# Patient Record
Sex: MALE | Race: BLACK OR AFRICAN AMERICAN | NOT HISPANIC OR LATINO | Employment: FULL TIME | URBAN - METROPOLITAN AREA
[De-identification: names, ages, dates, MRNs, and addresses within clinical notes are randomized per-mention and may not be internally consistent; named-entity substitution may affect disease eponyms.]

---

## 2023-06-21 ENCOUNTER — HOSPITAL ENCOUNTER (EMERGENCY)
Facility: CLINIC | Age: 33
Discharge: HOME | End: 2023-06-22
Attending: PSYCHIATRY & NEUROLOGY | Admitting: PSYCHIATRY & NEUROLOGY
Payer: COMMERCIAL

## 2023-06-21 DIAGNOSIS — F11.20 UNCOMPLICATED OPIOID DEPENDENCE (H): ICD-10-CM

## 2023-06-21 DIAGNOSIS — F15.10 METHAMPHETAMINE ABUSE (H): ICD-10-CM

## 2023-06-21 LAB — SARS-COV-2 RNA RESP QL NAA+PROBE: NEGATIVE

## 2023-06-21 PROCEDURE — 99285 EMERGENCY DEPT VISIT HI MDM: CPT | Mod: 25 | Performed by: PSYCHIATRY & NEUROLOGY

## 2023-06-21 PROCEDURE — 87635 SARS-COV-2 COVID-19 AMP PRB: CPT | Performed by: PSYCHIATRY & NEUROLOGY

## 2023-06-21 PROCEDURE — 250N000013 HC RX MED GY IP 250 OP 250 PS 637: Performed by: PSYCHIATRY & NEUROLOGY

## 2023-06-21 PROCEDURE — 99285 EMERGENCY DEPT VISIT HI MDM: CPT | Performed by: PSYCHIATRY & NEUROLOGY

## 2023-06-21 PROCEDURE — 90791 PSYCH DIAGNOSTIC EVALUATION: CPT

## 2023-06-21 RX ORDER — OLANZAPINE 10 MG/1
10 TABLET, ORALLY DISINTEGRATING ORAL ONCE
Status: COMPLETED | OUTPATIENT
Start: 2023-06-21 | End: 2023-06-21

## 2023-06-21 RX ADMIN — OLANZAPINE 10 MG: 10 TABLET, ORALLY DISINTEGRATING ORAL at 23:48

## 2023-06-21 ASSESSMENT — ACTIVITIES OF DAILY LIVING (ADL): ADLS_ACUITY_SCORE: 35

## 2023-06-22 VITALS
SYSTOLIC BLOOD PRESSURE: 101 MMHG | DIASTOLIC BLOOD PRESSURE: 66 MMHG | TEMPERATURE: 98.6 F | RESPIRATION RATE: 18 BRPM | OXYGEN SATURATION: 100 % | HEART RATE: 87 BPM

## 2023-06-22 ASSESSMENT — COLUMBIA-SUICIDE SEVERITY RATING SCALE - C-SSRS
5. HAVE YOU STARTED TO WORK OUT OR WORKED OUT THE DETAILS OF HOW TO KILL YOURSELF? DO YOU INTEND TO CARRY OUT THIS PLAN?: YES
2. HAVE YOU ACTUALLY HAD ANY THOUGHTS OF KILLING YOURSELF?: YES
4. HAVE YOU HAD THESE THOUGHTS AND HAD SOME INTENTION OF ACTING ON THEM?: YES
ATTEMPT LIFETIME: YES
2. HAVE YOU ACTUALLY HAD ANY THOUGHTS OF KILLING YOURSELF?: NO
1. HAVE YOU WISHED YOU WERE DEAD OR WISHED YOU COULD GO TO SLEEP AND NOT WAKE UP?: YES
4. HAVE YOU HAD THESE THOUGHTS AND HAD SOME INTENTION OF ACTING ON THEM?: NO
REASONS FOR IDEATION LIFETIME: EQUALLY TO GET ATTENTION, REVENGE, OR A REACTION FROM OTHERS AND TO END/STOP THE PAIN
3. HAVE YOU BEEN THINKING ABOUT HOW YOU MIGHT KILL YOURSELF?: YES
REASONS FOR IDEATION PAST MONTH: EQUALLY TO GET ATTENTION, REVENGE, OR A REACTION FROM OTHERS AND TO END/STOP THE PAIN
ATTEMPT PAST THREE MONTHS: NO
1. IN THE PAST MONTH, HAVE YOU WISHED YOU WERE DEAD OR WISHED YOU COULD GO TO SLEEP AND NOT WAKE UP?: YES
5. HAVE YOU STARTED TO WORK OUT OR WORKED OUT THE DETAILS OF HOW TO KILL YOURSELF? DO YOU INTEND TO CARRY OUT THIS PLAN?: NO

## 2023-06-22 ASSESSMENT — ACTIVITIES OF DAILY LIVING (ADL)
ADLS_ACUITY_SCORE: 35

## 2023-06-22 NOTE — ED PROVIDER NOTES
ED Observation Discharge Summary  [facility]  Discharge Date: 6/22/2023    Priscilla Pritchett MRN: [id number]   Age: 33 year old YOB: 1990     Brief HPI & Initial ED Course     Chief Complaint   Patient presents with     Depression     Struggling due to GF breaking up, housing problem, expecting a baby, pt reports feeling overwhelmed but denies suicidal ideation     HPI  Priscilla Pritchett is a 33 year old male with PMH notable for bipolar disorder who presented to the ED with altered mental status. Initial history was limited due to altered mental status. The patient arrived with altered mental status with reason to suspect alcohol or other drug intoxication as etiology, and an exam that was without findings suggestive of trauma.     Upon being evaluated in the emergency department, the patient was found to have a condition that would benefit from ongoing monitoring. Observation care was initiated with plan for close clinical monitoring of the patient and his mental status for clearing of intoxicating substance, and broadening of the work-up if not clearing appropriately or if other indications develop.     See ED Observation H&P for further details on the patient's presenting history and initial evaluation.     Physical Exam   BP: 102/69  Pulse: 61  Temp: 98.6  F (37  C)  Resp: 18  SpO2: 100 %    Physical Exam  General: no acute distress. Alert.   HENT: MMM. Atraumatic head.   Eyes: PERRL, normal sclerae   Neck: non-tender, supple  Cardio: Regular rate. Regular rhythm.   Resp: Normal work of breathing, normal respiratory rate  Abdomen: non-distended  Neuro: alert, fully oriented. Steady gait. CN II-XII grossly intact. Grossly normal strength and sensation.   Integumentary/Skin: no rash visualized, normal color    Results      Procedures                  Labs Ordered and Resulted from Time of ED Arrival to Time of ED Departure   COVID-19 VIRUS (CORONAVIRUS) BY PCR - Normal        Result Value    SARS CoV2 PCR Negative       Patient underwent CD assessment while in ED.       Observation Course   The patient was admitted to observation status with plan for close clinical monitoring of the patient and his mental status for clearing of intoxicating substance, and broadening of the work-up if not clearing appropriately or if other indications develop.     Serial assessments of the patient's mental status were performed. Nursing notes were reviewed. During the observation period, the patient did not require medications for agitation, and did not require restraints/seclusion for patient and/or provider safety.         With monitoring, patient's mental status cleared. Patient then clinically sober with steady gait and tolerating PO. Normalization of mental status with sobering makes other causes of altered mental status very unlikely.     After counseling on the diagnosis, work-up, and treatment plan, the patient was discharged. Recommended safe cessation of EtOH/drugs and provided information on community treatment resources. Patient to follow-up with primary care and CD treatment in the coming days for recheck and further cessation counseling. Patient to return to the ED if any urgent or potentially life-threatening concerns.    Discharge Diagnoses:   Final diagnoses:   Methamphetamine abuse (H)   Uncomplicated opioid dependence (H)     Chart documentation was completed with Dragon voice-recognition software. Even though reviewed, this chart may still contain some grammatical, spelling, and word errors.     --  VÍCTOR FONG MD, MD  [facility] EMERGENCY DEPARTMENT  6/22/2023             Víctor Fong MD  06/22/23 4713

## 2023-06-22 NOTE — DISCHARGE INSTRUCTIONS
Scheduled Appointment  Type: Telepsychiatry-Virtual  Date: Monday, 6/26/2023    Time: 3:00 pm - 4:00 pm  Provider: Abby MACK PA-C  Location: [facility], [address] [place] [address], [address], [place] [address]  Phone: [phone]  Patient Instructions  Please fill New Patient Form by using following link. All forms need to be completed 24 hours prior to the appointment date/time by going to [email or web address] Please call us on [phone] 24 hours prior to your scheduled appointment to confirm that you are able to attend. We will provide you information about how to log into video call software when you call.    Type: Teletherapy  Date: Monday, 6/26/2023    Time: 6:00 pm - 7:00 pm  Provider: Albertina Robert  [facility]  Location: [facility], [address] [place] [address]  Phone: [phone]  Patient Instructions  Telehealth/Virtual services only. No in-person services. New patients are contacted via phone/email by CDS office managers (to confirm information), before 1st appointment. Electronic device w/video capabilities required for services. New patients have e-paperwork to complete prior to 1st appointment (access to e-docs given by CDS  during intake). Call CDS w/questions [phone] or email [email or web address]        ---------------------------------------------------------------------------------------------------------    You may self refer for most Crisis Residence services. This is a short-term service, typically 3-10 days, for stabilization when experiencing a mental health crisis. They provide housing and treatment services that integrate mental health, medical, and substance use care.    [facility] [employer]  Main phone: [phone]   Direct: [phone]   [address] [place], [place] [address] [address]     [facility]    [phone]   [address] [facility] [address]     [facility] [employer]  Main phone: [phone]   Direct: [phone]   [address] [place] [address]     [facility]  [phone]   [address] [place] [address]     [facility] **requires referral from a medical facility  [phone] [address] [place] [address]     Wherever you attend for a crisis residence, if possible bring any medications you may have in their prescribed bottles.       Substance Use Disorder Direct Access Resources    It is recommended that you abstain from all mood altering chemicals. Please contact the sober support hotline ([phone]) as needed; phones are answered 24 hours a day, 7 days a week.    To access substance use treatment you must have a comprehensive assessment completed to begin any treatment program.     If uninsured, please contact your [facility] of residence for eligibility screen to substance use disorder evaluation and treatment:    [place] - [phone]   [facility] [phone]   [facility] [phone]   [facility] [phone]   [facility] [phone]   [facility] [phone]   [facility] [phone]   [place] - [phone]     If you have private insurance, call the customer service number on the back of your insurance card to find an in-network substance abuse use disorder assessment. The ideal provider will be a treatment facility, licensed in the state of [place].     Community FRANCA Evaluations: Clients may call their county for a full list of providers - Availability and services listed belo are subject to change, please call the provider to confirm    [facility]  [phone] [address] [place], [place], [address]  *Please call the above number to schedule a comprehensive assessment for determination of level of care needs. In person and virtual appointments available Mon-Fri.    [facility], [address], [address] Floor, [address], [place] [address] (next to the outpatient lab)    Phone: [phone]   Provides bridging services to people with Opiate Use Disorders (OUD) seeking care. This is a front door to Medication Assisted Treatments (MAT), ages 16+  Walk In hours: Monday-Friday 9:00am-3:00pm    [facility]  [phone]  Walk in Assessments: Mon-Friday 7a-1:45p  [address], [place], [address]    [facility]  Central Access [phone]  [address] [place], [place], [address]  *by appointment only    [facility]  [phone] (phone consultation available )  Locations in: [place], [place], [facility], and [place], [place]  Irish virtual IOP [phone] or visit [email or web address]   Also offers LGBTQ programming     [facility]  [phone]  [address] [facility]  [place], [place], [address]  *Currently only offered via telehealth - call to set up an appointment    [facility]  [address] [place] [address], [address]  [facility]  For more information to to make a referral call:  [phone]  Walk-in on   9-11 a.m.    [facility]  [phone]  [address] [place], [place], [address]  *available by appointments only    [facility]  [phone]  [address] [place], [place], [address]  *available by appointment only    [facility]  [phone]  [address] [place], [place], [address]  *walk in assessments available M-F starting at 7 am.    [facility]  [phone]  Locations: [facility], [facility], and [place]  *Walk in assessments availble M-F starting at 8 am -virtual only    [facility]  [phone]  [address] [place] [address]    [facility]  Virtual + Locations: [facility], [place],  [place], [facility], [address] [facility]   [phone]  *available by appointment only    [facility]  [phone]  [address]  [place], [place], [address]    [facility]  [phone]  [address]  [place], [place], [address]  *available by appointment    [facility]  [phone]  [address]  [place], [place], [address]  *walk ins available M-TH from 9-3    [facility]  MAT program: [phone]  [address] [place], [place], [address]    [place]  [phone]  Same day substance use disorder assessments are available Monday - Friday, via walk-in or by appointment at the [place] location.  [address], [address], [place] [address]     [facility] - adolescent and adult SUDs services  [phone]  Offer services Monday through Friday, as well as evening hours Monday through Thursday. Normally, a first appointment will be scheduled within one week  [email or web address]  Locations all over [place]    If you are intoxicated, you may be required to detox at a detox facility before starting treatment. The following are detox facilities that you can self present to. All detox facilities are able to help you complete an assessment prior to discharge if you choose:    [place] [facility]: Arrive at a [place] [facility] for immediate medical evaluation    [facility]: [address] [place], [place], [address].         [phone]    [facility]: [address] [place], [place], [address]  [phone]     [facility]): [address] [place], [place], [address] [phone]     [facility]: [address] [place], [address], [phone]         Ways to help cope with sobriety:    -- Take prescribed medicines as scheduled  -- Keep follow-up appointments  -- Talk to others about your concerns  -- Get regular  exercise  -- Practice deep breathing skills  -- Eat a healthy diet  -- Use community resources, including hotline numbers, [facility] crisis and support meetings  -- Stay sober and avoid places/people/things associated with substance use  --Maintain a daily schedule/routine  --Get at least 7-8 hours of sleep per night  --Create a list 10--20 healthy activities that you can do that are enjoyable and do not involve substance use  --Create daily goals (approx. 1-4 goals) per day and work to achieve them throughout the day.       Free Resources:    [facility] ([facility])  [facility] connects people seeking recovery to resources that help foster and sustain long-term recovery. Whether you are seeking resources for treatment, transportation, housing, job training, education, health care or other pathways to recovery, [facility] is a great place to start.  Phone: 190.768.5542. www.minnesotaAlertMe.OpenTrust (Great listing of all types of recovery and non-recovery related resources)    Alcoholics Anonymous  Phone: 9-605-ALCOHOL  Website: HTTP://WWW.AA.ORG/  AA [place] (007-005-8035 or http://aaSustaination.org)  AA [place] (735-634-1786 or www.aastpaul.org)     Narcotics Anonymous  Phone: 864.918.3108  Website: www.Swarm Mobile.Top10.com.    [facility] [address], [place], [place],  Phone: [phone]  Drop-in Hours: Monday-Friday 9-11:30 am. By appointment at other times.  Provides: [facility] is a drop-in center on the east side [facility] that provides a safe space for individuals who are homeless and have a history of chemical use. Sobriety is not a requirement but drugs and alcohol are not allowed on the property.  Services: Non-clients can access drop-in services such as Recovery and Harm Reduction Groups, referrals to case management, community activities, shower facilities, and a pool table. Individuals who are homeless and have chemical health needs may be eligible for enrollment into  [facility]'s case management program. Clients and  work together to access benefits, treatment, health care, shelter, and external housing resources.        Aftercare Plan    If I am feeling unsafe or I am in a crisis, I will:   Contact my established care providers   Call the National Suicide Prevention Lifeline: 988  Go to the nearest emergency room   Call 911     Warning signs that I or other people might notice when a crisis is developing for me: any suicidal ideation with plan or intent    Things I am able to do on my own to cope or help me feel better: Grounding Techniques:  Try to notice where you are, your surroundings including the people, the sounds like the TV or radio.  Concentrate on your breathing. Take a deep cleansing breath from your diaphragm. Count the breaths as you exhale. Make sure you breath slowly.  Hold something that you find comforting, for some it may be a stuffed animal or a blanket. Notice how it feels in your hands. Is it hard or soft?  During a non-crisis time make a list of positive affirmations. Print them out and keep them handy for times of intense anxiety. At those times, read them aloud.  Try the eBooks in Motion game:  Name 5 things you can see in the room with you  Name 4 things you can feel ( chair on my back  or  feet on floor )   Name 3 things you can hear right now ( people talking  or  tv )   Name 2 things you can smell right now (or, 2 things you like the smell of)   Name 1 good thing about yourself  Create A Safe Place  Image a safe place -- it can be a real or imaginary place:   What do you see -- especially colors?   What sounds do you hear?   What sensations do you feel?   What smells do you smell?   What people or animals would you want in your safe place?   Imagine a protective bubble, wall or boundary around your safe place.   Imagine a door or gate with a guard at your safe place.   Image a lock and key to your safe place and only you can unlock it.  You  can draw or make a collage that represents your safe place.   Choose a souvenir of your safe place -- a color, an object, a song.   Keep your image of your safe place so you can come back to it when you need to.    Things that I am able to do with others to cope or help me better: let your family know if you are needing more support     Things I can use or do for distraction: Reduce Extreme Emotion  QUICKLY:  Changing Your Body Chemistry      T:  Change your body Temperature to change your autonomic nervous system   Use Ice Water to calm yourself down FAST   Put your face in a bowl of ice water (this is the best way; have the person keep his/her face in ice water for 30-45 seconds - initial research is showing that the longer s/he can hold her/his face in the water, the better the response), or   Splash ice water on your face, or hold an ice pack on your face      I:  Intensely exercise to calm down a body revved up by emotion   Examples: running, walking fast, jumping, playing basketball, weight lifting, swimming, calisthenics, etc.   Engage in exercises that DO NOT include violent behaviors. Exercises that utilize violent behaviors tend to function as  behavioral rehearsal,  and rather than calming the person down, may actually  rev  the person up more, increasing the likelihood of violence, and lessening the likelihood that they will  burn off  energy     P:  Progressively relax your muscles   Starting with your hands, moving to your forearms, upper arms, shoulders, neck, forehead, eyes, cheeks and lips, tongue and teeth, chest, upper back, stomach, buttocks, thighs, calves, ankles, feet   Tense (10 seconds,   of the way), then relax each muscle (all the way)   Notice the tension   Notice the difference when relaxed (by tensing first, and then relaxing, you are able to get a more thorough relaxation than by simply relaxing)     P: Paced breathing to relax   The standard technique is to begin with counting the  "number of steps one takes for a typical inhale, then counting the steps one takes for a typical exhale, and then lengthening the amount of steps for the exhalation by one or two steps.  OR  Repeat this pattern for 1-2 minutes  Inhale for four (4) seconds   Exhale for six (6) to eight (8) seconds   Research demonstrated that one can change one's overall level of anxiety by doing this exercise for even a few minutes per day     Changes I can make to support my mental health and wellness: begin substance use treatment and medication management    People in my life that I can ask for help: medication management provider and substance use assessment    Your [facility] has a mental health crisis team you can call 24/7: [facility]  [phone]     Other things that are important when I'm in crisis: continue taking medication as prescribed     Additional resources and information:     Crisis Lines  Crisis Text Line  Text 029746  You will be connected with a trained live crisis counselor to provide support.    Por espanol, texto  MOISES a 414128 o texto a 442-AYUDAME en WhatsApp    The [employer]ee [employer] (LGBTQ Youth Crisis Line)  0.130.050.5871  text START to 118-576      Community Resources  Fast Tracker  Linking people to mental health and substance use disorder resources  fasttrackeDabban.org     [place] Mental Health Warm Line  Peer to peer support  Monday thru Saturday, 12 pm to 10 pm  627.255.0548 or 0.876.733.1572  Text \"Support\" to 49628    National Chloride on Mental Illness (DARIN)  871.719.5476 or 1.888.DARIN.HELPS      Mental Health Apps  My3  https://my3app.org/    VirtualHopeBox  https://oNoise.org/apps/virtual-hope-box/      Additional Information  Today you were seen by a licensed mental health professional through Triage and Transition services, [facility] ([facility])  for a crisis assessment in the Emergency Department at [facility].  It is recommended that " you follow up with your established providers (psychiatrist, mental health therapist, and/or primary care doctor - as relevant) as soon as possible. Coordinators from [facility] will be calling you in the next 24-48 hours to ensure that you have the resources you need.  You can also contact [facility] coordinators directly at [phone]. You may have been scheduled for or offered an appointment with a mental health provider. [facility] maintains an extensive network of licensed behavioral health providers to connect patients with the services they need.  We do not charge providers a fee to participate in our referral network.  We match patients with providers based on a patient's specific needs, insurance coverage, and location.  Our first effort will be to refer you to a provider within your care system, and will utilize providers outside your care system as needed.

## 2023-06-22 NOTE — ED PROVIDER NOTES
"    [facility] [place] EMERGENCY DEPARTMENT ([facility])    6/21/23      ED OBS H and P PROVIDER NOTE  AB  History     Chief Complaint   Patient presents with     Depression     Struggling due to GF breaking up, housing problem, expecting a baby, pt reports feeling overwhelmed but denies suicidal ideation     The history is provided by the patient and medical records.     Priscilla Pritchett is a 33 year old male with history of anxiety, depression, bipolar disorder, PTSD, substance use disorder, and borderline personality disorder who presents with worsening depression in setting of psychosocial stressors.  Patient was seen by DEC , please see consult note for further details.  He is 2 months behind on his rent. His rent is $1k a month and he is 2 months behind on his rent. Per DEC  his apartment complex has filed for eviction.  His girlfriend is 5 months pregnant and left him. He just started working a job 3 weeks ago as a , has his GED. He feels overwhelmed with this. As a result of all this stress he relapsed 2 days ago on meth and heroin again, by smoking and IV. Has been using constantly for past 2 days. He also vapes nicotine. he last used meth this morning, last used heroin last night.  Denies other substances. He states he does feel passively suicidal, rates it at a 6/10. He has no plan or intent. He reports 1 prior suicide attempt in 2014 via overdose. No suicide attempts since.  He states \"I need a 72 hour hold\" because he can't focus in his apartment on staying sober. He has no outpatient providers other than primary care. He denies homicidal ideation, no psychosis and no self-injury.  DEC  discussed assistance with reestablishing therapy and medications, patient is very happy with this plan.      [facility] Everywhere records reviewed, he was hospitalized at [facility] from 2/13-2/15/2023 for suicidal ideation.  During that admission he reported past inpatient admissions " for suicide attempts in  and .      Past Medical History  History reviewed. No pertinent past medical history.  Past Surgical History:   Procedure Laterality Date     MANDIBLE FRACTURE SURGERY       No current outpatient medications on file.    No Known Allergies  Family History  Family History   Problem Relation Age of Onset     Bipolar Disorder Maternal Uncle      Depression Other      Personality Disorder Other      Substance Abuse Father      Social History   Social History     Tobacco Use     Smoking status: Former     Packs/day: 0.50     Types: Vaping Device, Cigarettes     Quit date: 12/3/2014     Years since quittin.5   Substance Use Topics     Alcohol use: No     Comment: Alcoholic Drinks/day: History of substance of choice     Drug use: Not Currently     Types: Amphetamines, Marijuana         A medically appropriate review of systems was performed with pertinent positives and negatives noted in the HPI, and all other systems negative.    Physical Exam   BP: 102/69  Pulse: 61  Temp: 98.6  F (37  C)  Resp: 18  SpO2: 100 %  Physical Exam  Vitals and nursing note reviewed.   HENT:      Head: Normocephalic.   Eyes:      Pupils: Pupils are equal, round, and reactive to light.   Pulmonary:      Effort: Pulmonary effort is normal.   Musculoskeletal:         General: Normal range of motion.      Cervical back: Normal range of motion.   Neurological:      Mental Status: He is alert.   Psychiatric:         Attention and Perception: Attention normal. He does not perceive auditory or visual hallucinations.         Mood and Affect: Affect normal. Mood is anxious.         Speech: Speech normal.         Behavior: Behavior normal. Behavior is not agitated, aggressive, hyperactive or combative. Behavior is cooperative.         Thought Content: Thought content normal. Thought content is not paranoid or delusional. Thought content does not include homicidal or suicidal ideation.         Cognition and Memory:  Cognition and memory normal.         Judgment: Judgment normal.           ED Course, Procedures, & Data      Procedures            Results for orders placed or performed during the hospital encounter of 06/21/23   Asymptomatic COVID-19 Virus (Coronavirus) by PCR Nasopharyngeal     Status: Normal    Specimen: Nasopharyngeal; Swab   Result Value Ref Range    SARS CoV2 PCR Negative Negative    Narrative    Testing was performed using the Xpert Xpress SARS-CoV-2 Assay on the Cepheid Gene-Xpert Instrument Systems. Additional information about this Emergency Use Authorization (EUA) assay can be found via the Lab Guide. This test should be ordered for the detection of SARS-CoV-2 in individuals who meet SARS-CoV-2 clinical and/or epidemiological criteria as well as from individuals without symptoms or other reasons to suspect COVID-19. Test performance for asymptomatic patients has only been established in anterior nasal swab specimens. This test is for in vitro diagnostic use under the FDA EUA for laboratories certified under CLIA to perform high complexity testing. This test has not been FDA cleared or approved. A negative result does not rule out the presence of PCR inhibitors in the specimen or target RNA concentration below the limit of detection for the assay. The possibility of a false negative should be considered if the patient's recent exposure or clinical presentation suggests COVID-19. This test was validated by the [facility]. This laboratory is certified under the Clinical Laboratory Improvement Amendments (CLIA) as qualified to perform high complexity laboratory testing.       Medications   OLANZapine zydis (zyPREXA) ODT tab 10 mg (has no administration in time range)     Labs Ordered and Resulted from Time of ED Arrival to Time of ED Departure   COVID-19 VIRUS (CORONAVIRUS) BY PCR - Normal       Result Value    SARS CoV2 PCR Negative       No orders to display           Critical care was not performed.     Medical Decision Making  The patient's presentation was of high complexity (a chronic illness severe exacerbation, progression, or side effect of treatment).    The patient's evaluation involved:  an assessment requiring an independent historian (see separate area of note for details)  review of external note(s) from 3+ sources (see separate area of note for details)    The patient's management necessitated high risk (a decision regarding hospitalization).      Assessment & Plan    Patient is here for a mental health assessment. He admits to bingeing on meth and heroin and has been awake past several days. 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 He requests help for his ongoing polysubstance abuse problem. Patient is not suicidal. He is not impaired nor psychotic nor delusional.    Patient felt he should be placed on a 72 hour hold. He was told that it is not how the hold works. I recommended sleeping in the ED overnight with help of a dose of Zyprexa and see how he feels in the morning, likely refreshed once he slept. He may then be able to follow-up with an outpatient CD assessment for further treatment recommendations.    Patient agrees to the recommendations. He is made ED OBS. He is given a dose of Zyprexa 10 mg to help with sleeping.    I have reviewed the nursing notes. I have reviewed the findings, diagnosis, plan and need for follow up with the patient.    New Prescriptions    No medications on file       Final diagnoses:   Methamphetamine abuse (H)   Uncomplicated opioid dependence (H)       Jyoti MALIN, am serving as a trained medical scribe to document services personally performed by Dio Betancourt MD based on the provider's statements to me on June 21, 2023.  This document has been checked and approved by the attending provider.    IDio MD, was physically present and have  reviewed and verified the accuracy of this note documented by Jyoti Mello, medical scribe.      Dio eBtancourt MD     [facility] EMERGENCY DEPARTMENT  6/21/2023     Dio Betancourt MD  06/21/23 [id number]
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
## 2023-06-22 NOTE — PLAN OF CARE
"Priscilla Pritchett  June 22, 2023  Plan of Care Hand-off Note     Patient Care Path: Discharge    Plan for Care:     Patient is alert and oriented x4. Patient is adequately dressed and groomed. Patient has articulate speech with slow rate and normal volume. Patient has depressed mood and congruent affect. Patient was lethargic but nonetheless cooperative with the assessment process. 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 Patient endorses passive suicidal ideation \"3/5\" with no plan or intent. Patient denies any NSSI, HI, or AH/VH. Patient was given Zyprexa and allowed to sleep in our emergency department. Patient was scheduled for telepsychiatry, given resources for substance abuse treatment and crisis residences. Patient will be allowed to clear overnight and discharged in the morning.    Critical Safety Issues: polysubstance abuse and passive SI    Overview:  This patient is a child/adolescent: No    This patient has additional special visitor precautions: No    Legal Status: Voluntary    Contacts:    attempted to reach patient's mother listed in emergency contacts, FAMILIARachel Bjorn at [phone], but she did not answer.    Psychiatry Consult:  Psychiatry Consult not requested because medication management scheduled following anticipated discharge in the morning.    Updated Attending Provider regarding plan of care.    SAURABH Gimenez      "

## 2023-06-22 NOTE — PROGRESS NOTES
"Triage & Transition Services, Extended Care     Therapy Progress Note    Patient: Priscilla goes by \"Priscilla,\" uses he/him pronouns  Date of Service: June 22, 2023  Site of Service:  ED   Patient was seen in-person.     Presenting problem:   Priscilla is followed related to Boarding Status. Please see initial DEC/[facility] Crisis Assessment completed by Freida Matthews on 2/21 for complete assessment information. Notable concerns include depression and drug relapse.     Individuals Present: Priscilla & Agatha White    Session start: 956a  Session end: 1013a  Session duration in minutes: 17  Session number: 1  Anticipated number of sessions or this episode of care: 1  CPT utilized: 83187 - Psychotherapy (with patient) - 30 (16-37*) min    Current Presentation:   Pt presented as engaged and appropriate, oriented x4. He said that he feels much better today after sleeping last night. He denies SI, HI, and AVH. Pt reports again that it is difficult focusing on sobriety while living alone. Pt would like a CD consult. [facility] will meet with pt in ED prior to discharge.     Pt was scheduled with psychiatry appt. He reports that he also wants to be scheduled with therapy. I scheduled pt with therapy. Both appts are in pt's AVS.      Mental Status Exam:   Appearance: awake, alert and adequately groomed  Attitude: cooperative  Eye Contact: fair  Mood: better  Affect: appropriate and in normal range  Speech: clear, coherent  Psychomotor Behavior: no evidence of tardive dyskinesia, dystonia, or tics  Thought Process:  logical and goal oriented  Associations: no loose associations  Thought Content: no evidence of suicidal ideation or homicidal ideation and no evidence of psychotic thought  Insight: good  Judgement: fair  Oriented to: time, person, and place  Attention Span and Concentration: fair  Recent and Remote Memory: fair    Diagnosis:   1          Unspecified bipolar and related disorder        F31.9                           "   2          Posttraumatic stress disorder F43.10                           3          Borderline personality disorder           F60.3                             4          Unspecified opioid-related disorder    F11.99                           5          Unspecified stimulant-related disorder; Unspecified amphetamine or other stimulant-related disorder      F15.99             Therapeutic Intervention(s):   Provided active listening, unconditional positive regard, and validation.     Treatment Objective(s) Addressed:   The focus of this session was on identifying an appropriate aftercare plan .     Progress Towards Goals:   Patient reports improving symptoms. Patient is making progress towards treatment goals as evidenced by oriented and organized, slept overnight and cleared from substances.     Case Management:   Substance use  in ED will see pt today.      General Recommendations:   Continue to monitor for harm.     Plan:   Discharge: Pt arrived to ED last night with concerns of substance use and depression. He was allowed to stay overnight to clear from substances prior to discharge. Pt slept overnight and has cleared. He is organized, oriented, and engaged. Pt was offered financial assistance resources with county, he declined. Pt has been scheduled with therapy and psychiatry. Pt to meet with CD  in ED.       Plan for Care reviewed with Assigned Medical Provider? Yes. Provider, Madelaine, response: agree     Agatha White   Licensed Mental Health Professional (LMHP), [facility]  [phone]

## 2023-06-22 NOTE — CONSULTS
Diagnostic Evaluation Consultation  Crisis Assessment    Patient was assessed: In Person  Patient location: [facility] Adult Emergency Department  Was a release of information signed: Yes. Providers included on the release: schedule provider below.     Referral Data and Chief Complaint  Patient is a 33 year old presenting to the [facility] Adult Emergency Department for the following concerns: depression and drug relapse.      Informed Consent and Assessment Methods     Patient is his own guardian. Writer met with patient and explained the crisis assessment process, including applicable information disclosures and limits to confidentiality, assessed understanding of the process, and obtained consent to proceed with the assessment. Patient was observed to be able to participate in the assessment as evidenced by verbal agreement. Assessment methods included conducting a formal interview with patient, review of medical records, collaboration with medical staff, and obtaining relevant collateral information from family and community providers when available..     Over the course of this crisis assessment provided reassurance, offered validation, engaged patient in problem solving and disposition planning, worked with patient on safety and aftercare planning, assisted in processing patient's thoughts and feeling relating to presenting concerns and provided psychoeducation. Patient's response to interventions was fully engaged, though appears somewhat lethargic while clearing from substances.     Summary of Patient Situation     Patient is observed sleeping in the surge hallway. Patient had his feet up on the chair in front of him. Patient was woken up to engage in assessment, engaged but kept his eyes closed. Patient appears lethargic, presumably due to substances.  Patient reports he started a job 3 weeks ago and has not been able to work due to being intoxicated the past 2 days. Patient reports relapsing on  "methamphetamine and heroin 2 days ago, please see psychosocial stressors listed below. Patient reports he cannot take care of himself, so he walked to the hospital while he was close by. Patient was asked about suicidal ideation to which he replies \"ronna yeah\". Patient reports he \"needs a 72 hour hold\" because he cannot focus on his sobriety in his apartment.     Brief Psychosocial History    Patient reports living in an apartment alone. Patient reports his rent is $1000/month and he is 2 months behind. Patient reports his girlfriend is 5 months pregnant and left him. Patient has never been  and has no other children. Patient is in contact with parents and 5 siblings. Patient was in foster care from [age] years old. Patient reports he is working as a . Patient's highest level of education was GED. Per public record database, patient is set to be evicted. Patient has significant legal history including numerous drug related offenses, theft, domestic assault with intent to cause fear of harm or death, fleeing police, financial fraud, etc.    Significant Clinical History     Patient is his own legal guardian. Patient has no history of civil commitment. Patient has history of inpatient mental health and medical admissions for mental health concerns. Patient was most recently to [facility] in 2/2023 for depression and anxiety. Patient has history of AA but is not attending at this time. Patient reports he was established at [facility] [place] for outpatient mental health care but did not follow through. Patient does not have any outpatient mental health providers at this time. Patient has not taken his Wellbutrin or Gabapentin in 3 months. Patient has primary care services.     Collateral Information     attempted to reach patient's mother listed in emergency contacts, Merrill Milan at [phone], but she did not answer.     Risk Assessment  [place] Suicide Severity Rating Scale Full " Clinical Version:  Suicidal Ideation  1. Wish to be Dead (Lifetime): Yes  1. Wish to be Dead (Past 1 Month): Yes  2. Non-Specific Active Suicidal Thoughts (Lifetime): Yes  2. Non-Specific Active Suicidal Thoughts (Past 1 Month): No  3. Active Suicidal Ideation with any Methods (Not Plan) Without Intent to Act (Lifetime): Yes  3. Active Suicidal Ideation with any Methods (Not Plan) Without Intent to Act (Past 1 Month): No  4. Active Suicidal Ideation with Some Intent to Act, Without Specific Plan (Lifetime): Yes  4. Active Suicidal Ideation with Some Intent to Act, Without Specific Plan (Past 1 Month): No  5. Active Suicidal Ideation with Specific Plan and Intent (Lifetime): Yes  5. Active Suicidal Ideation with Specific Plan and Intent (Past 1 Month): No  Intensity of Ideation  Most Severe Ideation Rating (Lifetime): 5  Most Severe Ideation Rating (Past 1 Month): (P) 3  Frequency (Lifetime): (P) 2-5 times in week  Frequency (Past 1 Month): (P) Less than once a week  Duration (Lifetime): (P) 1-4 hours/a lot of time  Duration (Past 1 Month): (P) Fleeting, few seconds or minutes  Controllability (Lifetime): (P) Unable to control thoughts  Controllability (Past 1 Month): (P) Easily able to control thoughts  Deterrents (Lifetime): (P) Deterrents definitely did not stop you  Deterrents (Past 1 Month): (P) Does not apply  Reasons for Ideation (Lifetime): (P) Equally to get attention, revenge, or a reaction from others and to end/stop the pain  Reasons for Ideation (Past 1 Month): (P) Equally to get attention, revenge, or a reaction from others and to end/stop the pain  Suicidal Behavior  Actual Attempt (Lifetime): (P) Yes  Actual Attempt (Past 3 Months): (P) No  Has subject engaged in non-suicidal self-injurious behavior? (Lifetime): (P) Yes  Has subject engaged in non-suicidal self-injurious behavior? (Past 3 Months): (P) No  C-SSRS Risk (Lifetime/Recent)  Calculated C-SSRS Risk Score (Lifetime/Recent): (P) Moderate  "Risk      Validity of evaluation is impacted by presenting factors during interview: methamphetamine and heroin use.   Comments regarding subjective versus objective responses to [place]ura tool: congruent  Environmental or Psychosocial Events: challenging interpersonal relationships, unstable housing, homelessness and excessive debt, poor finances  Chronic Risk Factors: history of psychiatric hospitalization   Warning Signs: increasing substance use or abuse  Protective Factors: lives in a responsibly safe and stable environment, sense of importance of health and wellness, able to access care without barriers and help seeking  Interpretation of Risk Scoring, Risk Mitigation Interventions and Safety Plan:  Patient was asked about suicidal ideation to which he replies \"ronna yeah\". Patient endorses passive suicidal ideation \"3/5\" with no plan or intent.        Does the patient have thoughts of harming others? No     Is the patient engaging in sexually inappropriate behavior?  No       Current Substance Abuse     Is there recent substance abuse? Patient endorses 4 months of sobriety until relapsing 2 days ago. Patient reports using methamphetamine, most recently this morning. Patient's reports using heroin, most recently last night. Patient reports he smokes or injects intravenously. Patient has been using \"constantly\". Patient also vapes nicotine.     Was a urine drug screen or blood alcohol level obtained: pending.       Mental Status Exam     Affect: Appropriate   Appearance: Disheveled    Attention Span/Concentration: Attentive  Eye Contact: Eyes closed  Fund of Knowledge: Appropriate    Language /Speech Content: Fluent   Language /Speech Volume: Soft    Language /Speech Rate/Productions: Slow    Recent Memory: Intact   Remote Memory: Intact   Mood: Depressed    Orientation to Person: Yes    Orientation to Place: Yes   Orientation to Time of Day: Yes    Orientation to Date: Yes    Situation (Do they understand why " "they are here?): Yes    Psychomotor Behavior: Underactive    Thought Content: Suicidal \"ronna, yeah\"  Thought Form: Intact      History of commitment: No       Medication    Psychotropic medications: Patient reports he has been off his Wellbutrin and Gabapentin for 3 months.  Medication changes made in the last two weeks: No       Current Care Team    Primary Care Provider: MARISOL Li CNP at [facility]  Patient reports he had mental health services through [facility] but they discharged him 3 months ago for failing to send in paperwork.      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 Patient is adequately dressed and groomed. Patient has articulate speech with slow rate and normal volume. Patient has depressed mood and congruent affect. Patient was lethargic but nonetheless cooperative with the assessment process. 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 Patient endorses passive suicidal ideation \"3/5\" with no plan or intent. Patient denies any NSSI, HI, or AH/VH. Patient was given Zyprexa and allowed to sleep in our emergency department. Patient was scheduled for telepsychiatry, given resources for substance abuse treatment and crisis residences. Patient will be allowed to clear overnight and discharged in the morning.    Disposition    Recommended disposition: Medication Management and Rule 25/FRANCA Assessment       Reviewed case and recommendations with attending provider. Attending Name: Dr. Wharton " Serafin       Attending concurs with disposition: Yes       Patient and/or validated legal guardian concurs with disposition: Yes       Final disposition: Patient was scheduled for telepsychiatry, given resources for substance abuse treatment and crisis residences. Patient will be allowed to clear overnight and discharged in the morning.    Outpatient Details (if applicable):   Aftercare plan and appointments placed in the AVS and provided to patient: Yes    Was lethal means counseling provided as a part of aftercare planning? No, patient denies any plan or intent for suicide.       Assessment Details    Patient interview started at: 10:50pm and completed at: 11:20pm.     Total time spent with the patient or their family: .50 hrs      CPT code(s) utilized: 86478 - Psychotherapy for Crisis - 60 (30-74*) min       MONTY Gimenez, SAURABH, Psychotherapist  [facility] - Triage & Transition Services  Callback: [phone]    Scheduled Appointment  Type: Telepsychiatry-Virtual  Date: Monday, 6/26/2023    Time: 3:00 pm - 4:00 pm  Provider: Abby MACK PA-C  Location: [facility], [address], [address], [place] [address]  Phone: [phone]  Patient Instructions  Please fill New Patient Form by using following link. All forms need to be completed 24 hours prior to the appointment date/time by going to [email or web address] Please call us on [phone] 24 hours prior to your scheduled appointment to confirm that you are able to attend. We will provide you information about how to log into video call software when you call.    You may self refer for most Crisis Residence services. This is a short-term service, typically 3-10 days, for stabilization when experiencing a mental health crisis. They provide housing and treatment services that integrate mental health, medical, and substance use care.    [facility] - [facility] [employer].  Main phone: [phone]   Direct:  [phone]   [address] [place] [address]     [facility]   [phone]   [address] [facility] [address]     [facility] - People [employer].  Main phone: [phone]   Direct: [phone]   [address] [place] [address]     [facility]  [phone]   [address] [place] [address]     [facility] **requires referral from a medical facility  [phone] [address] [place] [address]     Wherever you attend for a crisis residence, if possible bring any medications you may have in their prescribed bottles.       Substance Use Disorder Direct Access Resources    It is recommended that you abstain from all mood altering chemicals. Please contact the sober support hotline ([phone]) as needed; phones are answered 24 hours a day, 7 days a week.    To access substance use treatment you must have a comprehensive assessment completed to begin any treatment program.     If uninsured, please contact your county of residence for eligibility screen to substance use disorder evaluation and treatment:    [place] - [phone]   [facility] [phone]   [facility] [phone]   [facility] [phone]   [facility] [phone]   [facility] [phone]   [facility] [phone]   [place] - [phone]     If you have private insurance, call the customer service number on the back of your insurance card to find an in-network substance abuse use disorder assessment. The ideal provider will be a treatment facility, licensed in the state [facility].     Community FRANCA Evaluations: Clients may call their county for a full list of providers - Availability and services listed belo are subject to change, please call the provider to confirm    [facility]  [phone]  [facility] [place], [place], [address]  *Please call the above number to schedule a comprehensive assessment for  determination of level of care needs. In person and virtual appointments available Mon-Fri.    [facility], [address] Floor, [address], [place] [address] (next to the outpatient lab)    Phone: [phone]   Provides bridging services to people with Opiate Use Disorders (OUD) seeking care. This is a front door to Medication Assisted Treatments (MAT), ages 16+  Walk In hours: Monday-Friday 9:00am-3:00pm    [facility]  [phone]  Walk in Assessments: Mon-Friday 7a-1:45p  [address], [place], [address]    [facility] - [facility]  Central Access [phone]  [address] [place], [place], [address]  *by appointment only    [facility]  [phone] (phone consultation available )  Locations in: [place], [place], and [place], [place]  L[place] virtual [facility] [phone] or visit [email or web address]   Also offers LGBTQ programming     [facility]  [phone]  [address] [facility]  [place], [place], [address]  *Currently only offered via telehealth - call to set up an appointment    [facility]  [address] [place] [address], [address]  [facility] Program  For more information to to make a referral call:  [phone]  Walk-in on   9-11 a.m.    [facility]  [phone]  [address] [place], [place], [address]  *available by appointments only    [facility] specific  [phone]  [address] [place], [place], [address]  *available by appointment only    [facility]  [phone]  [address] [place], [place], [address]  *walk in assessments available M-F starting at 7 am.    [facility]  [phone]  Locations: [facility], [facility], and [place]  *Walk in assessments availble M-F starting at 8 am -virtual only    [facility]  [phone]  [address] [facility] [place] [address]    [facility]  [facility] Locations: [place], [place], [place], [place], [facility], [place], Brianda Schumacher   [phone]  *available by appointment only    [facility]  [phone]  [address]  [place], [place], [address]    [facility]  [phone]  [address]  [place], [place], [address]  *available by appointment    [facility]  [phone]  [address]  [place], [place], [address]  *walk ins available M-TH from 9-3    [facility] program: [phone]  [address] [place], [place], [address]    [place]  [phone]  Same day substance use disorder assessments are available Monday - Friday, via walk-in or by appointment at the [place] location.  [address] [place] [address], [address], [place] [address]     [facility] - adolescent and adult SUDs services  [phone]  Offer services Monday through Friday, as well as evening hours Monday through Thursday. Normally, a first appointment will be scheduled within one week  [email or web address]  Locations all over [place]    If you are intoxicated, you may be required to detox at a detox facility before starting treatment. The following are detox facilities that you can self present to. All detox facilities are able to help you complete an assessment prior to discharge if you choose:    [facility]: Arrive at a [facility] for immediate medical evaluation    [facility]: [address], [place], [address].         [phone]    [facility]: [address] [place], [place], [address]  [phone]     [facility] [address] [place], [place], [address]  [phone]     [facility]: [address] [place], [place], [address], [phone]         Ways to help cope with sobriety:    -- Take prescribed medicines  as scheduled  -- Keep follow-up appointments  -- Talk to others about your concerns  -- Get regular exercise  -- Practice deep breathing skills  -- Eat a healthy diet  -- Use community resources, including hotline numbers, [facility] crisis and support meetings  -- Stay sober and avoid places/people/things associated with substance use  --Maintain a daily schedule/routine  --Get at least 7-8 hours of sleep per night  --Create a list 10--20 healthy activities that you can do that are enjoyable and do not involve substance use  --Create daily goals (approx. 1-4 goals) per day and work to achieve them throughout the day.       Free Resources:    [facility])  [facility] connects people seeking recovery to resources that help foster and sustain long-term recovery. Whether you are seeking resources for treatment, transportation, housing, job training, education, health care or other pathways to recovery, [facility] is a great place to start.  Phone: 142.342.6576. www.minnesotaIndex.Crew (Great listing of all types of recovery and non-recovery related resources)    Alcoholics Anonymous  Phone: 9-790-ALCOHOL  Website: HTTP://WWW.AA.ORG/  AA [place] (317-008-2045 or http://aaCorduro.org)  AA [place] (139-949-9619 or www.aasaul.org)     Narcotics Anonymous  Phone: 836.655.8558  Website: www.[employer].[address].    [facility] [address], [place], [place],  Phone: [phone]  Drop-in Hours: Monday-Friday 9-11:30 am. By appointment at other times.  Provides: [facility] is a drop-in center on the east side [employer] that provides a safe space for individuals who are homeless and have a history of chemical use. Sobriety is not a requirement but drugs and alcohol are not allowed on the property.  Services: Non-clients can access drop-in services such as Recovery and Harm Reduction Groups, referrals to case management, community activities, shower facilities, and a pool table.  Individuals who are homeless and have chemical health needs may be eligible for enrollment into [facility]'s case management program. Clients and  work together to access benefits, treatment, health care, shelter, and external housing resources.        Aftercare Plan    If I am feeling unsafe or I am in a crisis, I will:   Contact my established care providers   Call the National Suicide Prevention Lifeline: 984  Go to the nearest emergency room   Call 911     Warning signs that I or other people might notice when a crisis is developing for me: any suicidal ideation with plan or intent    Things I am able to do on my own to cope or help me feel better: Grounding Techniques:    Try to notice where you are, your surroundings including the people, the sounds like the TV or radio.    Concentrate on your breathing. Take a deep cleansing breath from your diaphragm. Count the breaths as you exhale. Make sure you breath slowly.    Hold something that you find comforting, for some it may be a stuffed animal or a blanket. Notice how it feels in your hands. Is it hard or soft?    During a non-crisis time make a list of positive affirmations. Print them out and keep them handy for times of intense anxiety. At those times, read them aloud.  Try the Bluelock game:    Name 5 things you can see in the room with you    Name 4 things you can feel ( chair on my back  or  feet on floor )     Name 3 things you can hear right now ( people talking  or  tv )     Name 2 things you can smell right now (or, 2 things you like the smell of)     Name 1 good thing about yourself  Create A Safe Place    Image a safe place -- it can be a real or imaginary place:     What do you see -- especially colors?     What sounds do you hear?     What sensations do you feel?     What smells do you smell?     What people or animals would you want in your safe place?     Imagine a protective bubble, wall or boundary around your safe place.      Imagine a door or gate with a guard at your safe place.     Image a lock and key to your safe place and only you can unlock it.    You can draw or make a collage that represents your safe place.     Choose a souvenir of your safe place -- a color, an object, a song.     Keep your image of your safe place so you can come back to it when you need to.    Things that I am able to do with others to cope or help me better: let your family know if you are needing more support     Things I can use or do for distraction: Reduce Extreme Emotion  QUICKLY:  Changing Your Body Chemistry      T:  Change your body Temperature to change your autonomic nervous system   ? Use Ice Water to calm yourself down FAST   ? Put your face in a bowl of ice water (this is the best way; have the person keep his/her face in ice water for 30-45 seconds - initial research is showing that the longer s/he can hold her/his face in the water, the better the response), or   ? Splash ice water on your face, or hold an ice pack on your face      I:  Intensely exercise to calm down a body revved up by emotion   ? Examples: running, walking fast, jumping, playing basketball, weight lifting, swimming, calisthenics, etc.   ? Engage in exercises that DO NOT include violent behaviors. Exercises that utilize violent behaviors tend to function as  behavioral rehearsal,  and rather than calming the person down, may actually  rev  the person up more, increasing the likelihood of violence, and lessening the likelihood that they will  burn off  energy     P:  Progressively relax your muscles   ? Starting with your hands, moving to your forearms, upper arms, shoulders, neck, forehead, eyes, cheeks and lips, tongue and teeth, chest, upper back, stomach, buttocks, thighs, calves, ankles, feet   ? Tense (10 seconds,   of the way), then relax each muscle (all the way)   ? Notice the tension   ? Notice the difference when relaxed (by tensing first, and then relaxing,  "you are able to get a more thorough relaxation than by simply relaxing)     P: Paced breathing to relax   ? The standard technique is to begin with counting the number of steps one takes for a typical inhale, then counting the steps one takes for a typical exhale, and then lengthening the amount of steps for the exhalation by one or two steps.  OR  ? Repeat this pattern for 1-2 minutes  ? Inhale for four (4) seconds   ? Exhale for six (6) to eight (8) seconds   ? Research demonstrated that one can change one's overall level of anxiety by doing this exercise for even a few minutes per day     Changes I can make to support my mental health and wellness: begin substance use treatment and medication management    People in my life that I can ask for help: medication management provider and substance use assessment    Your [facility] has a mental health crisis team you can call 24/7: [facility]  [phone]     Other things that are important when I'm in crisis: continue taking medication as prescribed     Additional resources and information:     Crisis Lines  Crisis Text Line  Text 634839  You will be connected with a trained live crisis counselor to provide support.    Por espanol, texto  MOISES a 954643 o texto a 442-AYUDAME en WhatsApp    [employer] (LGBTQ Youth Crisis Line)  7.434.871.6867  text START to 334-634      [facility]  Fast Tracker  Linking people to mental health and substance use disorder resources  fasttrackermn.org     [place] Mental Health Warm Line  Peer to peer support  Monday thru Saturday, 12 pm to 10 pm  104.985.6923 or 6.762.411.5215  Text \"Support\" to 99963    [employer] [place] [employer] Mental Illness (DARIN)  548.770.8481 or 1.888.DARIN.HELPS      Mental Health Apps  My3  https://my3app.org/    VirtualHopeBox  https://[employer].org/apps/virtual-hope-box/      Additional Information  Today you were seen by a licensed mental health professional through " Triage and Transition services, [facility] ([facility])  for a crisis assessment in the Emergency Department at [facility].  It is recommended that you follow up with your established providers (psychiatrist, mental health therapist, and/or primary care doctor - as relevant) as soon as possible. Coordinators from [facility] will be calling you in the next 24-48 hours to ensure that you have the resources you need.  You can also contact [facility] coordinators directly at [phone]. You may have been scheduled for or offered an appointment with a mental health provider. [facility] maintains an extensive network of licensed behavioral health providers to connect patients with the services they need.  We do not charge providers a fee to participate in our referral network.  We match patients with providers based on a patient's specific needs, insurance coverage, and location.  Our first effort will be to refer you to a provider within your care system, and will utilize providers outside your care system as needed.

## 2023-06-22 NOTE — CONSULTS
6/22/2023  Pt completed his FRANCA CA today. He signed ROIs for [facility] and [facility] and his FRANCA CA was faxed there today with instructions to call him.    AICHA Assessment ID: [id number]    Recommendations:   1)  Complete a residential based or similar treatment program.  2)  Abstain from all mood-altering chemicals unless prescribed by a licensed provider.   3)  Attend, at minimum, 2 weekly support group meetings, such as 12 step based (AA/NA), SMART Recovery, Health Realizations, and/or Refuge Recovery meetings.     4)  Actively work with a male mentor/sponsor on a weekly basis.   5)  Follow all the recommendations of your treatment/medical providers.    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 Pt needs additional sober supports and additional sober coping skills. His mental health often leads to relapses. Pt is agreeable towards attending IP FRANCA.    Referrals/ Alternatives:  [facility]'s IP  [address] [place] [address]  Phone: [phone]  Fax: [phone]  [email or web address]    [facility]   Central Access Team - Assessments & Admissions  Central Access Team  Direct # [phone]  [email or web address]       FRANCA consult completed by: Sakina Sarah [facility].  Phone Number: [phone]  E-mail Address: [email or web address] Department  [address] [place] [address]     *Due to regulation of Title 42 of the Code of Federal Regulations (CFR) Part 2: Confidentiality laws apply to this note and the information wherein.  Thus, this note cannot be copy and pasted into any other health care staff's note nor can it be included in general medical records sent to ANY outside agency without the patient's written consent.